# Patient Record
(demographics unavailable — no encounter records)

---

## 2025-04-15 NOTE — PHYSICAL EXAM
[General Appearance - Alert] : alert [General Appearance - In No Acute Distress] : in no acute distress [General Appearance - Well Nourished] : well nourished [General Appearance - Well Developed] : well developed [General Appearance - Well-Appearing] : healthy appearing [Ankle Swelling (On Exam)] : present [Ankle Swelling Bilaterally] : bilaterally  [Varicose Veins Of Lower Extremities] : bilaterally [] : on both lower extremities [Ankle Swelling On The Left] : moderate [Delayed in the Right Toes] : capillary refills normal in right toes [Delayed in the Left Toes] : capillary refills normal in the left toes [0] : left foot posterior tibialis 0 [2+] : left foot dorsalis pedis 2+ [de-identified] : Patient with bilateral bunion deformities patient also has hammertoe deformities.  Patient states crest slings have helped hammertoes greatly. [FreeTextEntry1] : Thick fungal toenails noted.  Nails are thick yellow with subungual debris. [Sensation] : the sensory exam was normal to light touch and pinprick [No Focal Deficits] : no focal deficits [Deep Tendon Reflexes (DTR)] : deep tendon reflexes were 2+ and symmetric [Motor Exam] : the motor exam was normal [Oriented To Time, Place, And Person] : oriented to person, place, and time [Impaired Insight] : insight and judgment were intact [Affect] : the affect was normal

## 2025-04-15 NOTE — PHYSICAL EXAM
[General Appearance - Alert] : alert [General Appearance - In No Acute Distress] : in no acute distress [General Appearance - Well Nourished] : well nourished [General Appearance - Well Developed] : well developed [General Appearance - Well-Appearing] : healthy appearing [Ankle Swelling (On Exam)] : present [Ankle Swelling Bilaterally] : bilaterally  [Varicose Veins Of Lower Extremities] : bilaterally [] : on both lower extremities [Ankle Swelling On The Left] : moderate [Delayed in the Right Toes] : capillary refills normal in right toes [Delayed in the Left Toes] : capillary refills normal in the left toes [0] : left foot posterior tibialis 0 [2+] : left foot dorsalis pedis 2+ [de-identified] : Patient with bilateral bunion deformities patient also has hammertoe deformities.  Patient states crest slings have helped hammertoes greatly. [FreeTextEntry1] : Thick fungal toenails noted.  Nails are thick yellow with subungual debris. [Sensation] : the sensory exam was normal to light touch and pinprick [No Focal Deficits] : no focal deficits [Deep Tendon Reflexes (DTR)] : deep tendon reflexes were 2+ and symmetric [Motor Exam] : the motor exam was normal [Oriented To Time, Place, And Person] : oriented to person, place, and time [Impaired Insight] : insight and judgment were intact [Affect] : the affect was normal

## 2025-04-15 NOTE — ASSESSMENT
[FreeTextEntry1] : Full examination performed we discussed bunion and hammertoe treatment options patient is not interested in surgery due to her advanced age we discussed shoe gear changes we discussed custom inserts paddings strappings and physical therapy patient will try at home therapy along with wider shoes warm soaks patient will also take extra strength Tylenol as needed.  In addition we discussed treatment for nail fungus consisting of topical medications patient is not interested in oral medications at this time.  We fabricated crest slings for hammertoe deformities we also discussed possible surgery.  Patient also has fungal nails nails were debrided with a double-action nail clipper and electrical file.  Once nails were debrided there is much less pain.  Patient also has a callus subfirst metatarsal left sterile debridement for callus patient was not trim their own calluses or nails patient meets at risk footcare criteria.

## 2025-06-18 NOTE — PHYSICAL EXAM
[General Appearance - Alert] : alert [General Appearance - In No Acute Distress] : in no acute distress [General Appearance - Well Nourished] : well nourished [General Appearance - Well Developed] : well developed [General Appearance - Well-Appearing] : healthy appearing [Ankle Swelling (On Exam)] : present [Ankle Swelling Bilaterally] : bilaterally  [Varicose Veins Of Lower Extremities] : bilaterally [] : on both lower extremities [Ankle Swelling On The Left] : moderate [Delayed in the Right Toes] : capillary refills normal in right toes [Delayed in the Left Toes] : capillary refills normal in the left toes [0] : left foot posterior tibialis 0 [2+] : left foot dorsalis pedis 2+ [FreeTextEntry3] : Patient meets at risk footcare criteria. [de-identified] : Patient with bilateral bunion deformities patient also has hammertoe deformities.  Patient states crest slings have helped hammertoes greatly. [FreeTextEntry1] : Thick fungal toenails noted.  Nails are thick yellow with subungual debris.  Pain on palpation of toenails both feet.  Thick yellow onychomycosis. [Sensation] : the sensory exam was normal to light touch and pinprick [No Focal Deficits] : no focal deficits [Deep Tendon Reflexes (DTR)] : deep tendon reflexes were 2+ and symmetric [Motor Exam] : the motor exam was normal [Oriented To Time, Place, And Person] : oriented to person, place, and time [Impaired Insight] : insight and judgment were intact [Affect] : the affect was normal

## 2025-06-18 NOTE — ASSESSMENT
[FreeTextEntry1] : Full examination performed we discussed bunion and hammertoe treatment options patient is not interested in surgery due to her advanced age we discussed shoe gear changes we discussed custom inserts paddings strappings and physical therapy patient will try at home therapy along with wider shoes warm soaks patient will also take extra strength Tylenol as needed.  In addition we discussed treatment for nail fungus consisting of topical medications patient is not interested in oral medications at this time.  We fabricated crest slings for hammertoe deformities we also discussed possible surgery.  Hammertoes causing distal pain as well is pain on top.  Patient also has fungal nails nails were debrided with a double-action nail clipper and electrical file.  Once nails were debrided there is much less pain.  Patient also has a callus subfirst metatarsal left sterile debridement for callus patient was not trim their own calluses or nails patient meets at risk footcare criteria.  Patient was not debrided toenails due to at risk criteria all questions answered and reviewed follow-up 10 weeks.

## 2025-06-18 NOTE — PHYSICAL EXAM
[General Appearance - Alert] : alert [General Appearance - In No Acute Distress] : in no acute distress [General Appearance - Well Nourished] : well nourished [General Appearance - Well Developed] : well developed [General Appearance - Well-Appearing] : healthy appearing [Ankle Swelling (On Exam)] : present [Ankle Swelling Bilaterally] : bilaterally  [Varicose Veins Of Lower Extremities] : bilaterally [] : on both lower extremities [Ankle Swelling On The Left] : moderate [Delayed in the Right Toes] : capillary refills normal in right toes [Delayed in the Left Toes] : capillary refills normal in the left toes [0] : left foot posterior tibialis 0 [2+] : left foot dorsalis pedis 2+ [FreeTextEntry3] : Patient meets at risk footcare criteria. [de-identified] : Patient with bilateral bunion deformities patient also has hammertoe deformities.  Patient states crest slings have helped hammertoes greatly. [FreeTextEntry1] : Thick fungal toenails noted.  Nails are thick yellow with subungual debris.  Pain on palpation of toenails both feet.  Thick yellow onychomycosis. [Sensation] : the sensory exam was normal to light touch and pinprick [No Focal Deficits] : no focal deficits [Deep Tendon Reflexes (DTR)] : deep tendon reflexes were 2+ and symmetric [Motor Exam] : the motor exam was normal [Oriented To Time, Place, And Person] : oriented to person, place, and time [Impaired Insight] : insight and judgment were intact [Affect] : the affect was normal